# Patient Record
Sex: FEMALE | Race: BLACK OR AFRICAN AMERICAN
[De-identification: names, ages, dates, MRNs, and addresses within clinical notes are randomized per-mention and may not be internally consistent; named-entity substitution may affect disease eponyms.]

---

## 2017-06-17 ENCOUNTER — HOSPITAL ENCOUNTER (EMERGENCY)
Dept: HOSPITAL 25 - ED | Age: 22
Discharge: HOME | End: 2017-06-17
Payer: COMMERCIAL

## 2017-06-17 VITALS — DIASTOLIC BLOOD PRESSURE: 69 MMHG | SYSTOLIC BLOOD PRESSURE: 109 MMHG

## 2017-06-17 DIAGNOSIS — F31.9: ICD-10-CM

## 2017-06-17 DIAGNOSIS — F41.9: Primary | ICD-10-CM

## 2017-06-17 LAB
ALBUMIN SERPL BCG-MCNC: 4.9 G/DL (ref 3.2–5.2)
ALP SERPL-CCNC: 38 U/L (ref 34–104)
ALT SERPL W P-5'-P-CCNC: 12 U/L (ref 7–52)
ANION GAP SERPL CALC-SCNC: 12 MMOL/L (ref 2–11)
APAP SERPL-MCNC: < 15 MCG/ML
AST SERPL-CCNC: 19 U/L (ref 13–39)
BUN SERPL-MCNC: 18 MG/DL (ref 6–24)
BUN/CREAT SERPL: 23.4 (ref 8–20)
CALCIUM SERPL-MCNC: 9.5 MG/DL (ref 8.6–10.3)
CHLORIDE SERPL-SCNC: 102 MMOL/L (ref 101–111)
GLOBULIN SER CALC-MCNC: 2.9 G/DL (ref 2–4)
GLUCOSE SERPL-MCNC: 89 MG/DL (ref 70–100)
HCO3 SERPL-SCNC: 23 MMOL/L (ref 22–32)
HCT VFR BLD AUTO: 40 % (ref 35–47)
HGB BLD-MCNC: 12.8 G/DL (ref 12–16)
MCH RBC QN AUTO: 27 PG (ref 27–31)
MCHC RBC AUTO-ENTMCNC: 32 G/DL (ref 31–36)
MCV RBC AUTO: 83 FL (ref 80–97)
POTASSIUM SERPL-SCNC: 3.3 MMOL/L (ref 3.5–5)
PROT SERPL-MCNC: 7.8 G/DL (ref 6.4–8.9)
RBC # BLD AUTO: 4.8 10^6/UL (ref 4–5.4)
SALICYLATES SERPL-MCNC: < 2.5 MG/DL (ref ?–30)
SODIUM SERPL-SCNC: 137 MMOL/L (ref 133–145)
TSH SERPL-ACNC: 3.77 MCIU/ML (ref 0.34–5.6)
WBC # BLD AUTO: 9 10^3/UL (ref 3.5–10.8)

## 2017-06-17 PROCEDURE — 80307 DRUG TEST PRSMV CHEM ANLYZR: CPT

## 2017-06-17 PROCEDURE — G0480 DRUG TEST DEF 1-7 CLASSES: HCPCS

## 2017-06-17 PROCEDURE — 87086 URINE CULTURE/COLONY COUNT: CPT

## 2017-06-17 PROCEDURE — 81015 MICROSCOPIC EXAM OF URINE: CPT

## 2017-06-17 PROCEDURE — 85025 COMPLETE CBC W/AUTO DIFF WBC: CPT

## 2017-06-17 PROCEDURE — 80320 DRUG SCREEN QUANTALCOHOLS: CPT

## 2017-06-17 PROCEDURE — 84702 CHORIONIC GONADOTROPIN TEST: CPT

## 2017-06-17 PROCEDURE — 99283 EMERGENCY DEPT VISIT LOW MDM: CPT

## 2017-06-17 PROCEDURE — 80329 ANALGESICS NON-OPIOID 1 OR 2: CPT

## 2017-06-17 PROCEDURE — 36415 COLL VENOUS BLD VENIPUNCTURE: CPT

## 2017-06-17 PROCEDURE — 80053 COMPREHEN METABOLIC PANEL: CPT

## 2017-06-17 PROCEDURE — 84443 ASSAY THYROID STIM HORMONE: CPT

## 2017-06-17 PROCEDURE — 81003 URINALYSIS AUTO W/O SCOPE: CPT

## 2017-06-17 NOTE — ED
Maximiliano HERNANDEZ SooYoung, scribed for Louie Peña MD on 06/17/17 at 0247 .





Complex/Multi-Sys Presentation





- HPI Summary


HPI Summary: 





A 20 y/o F presents to ED with c/o LUE "pressure" onset approx one hour ago. Pt 

states she hasn't taken her medication properly today. She notes feeling sleepy 

this afternoon and having "feelings of dementia," but currently in ED, she is 

wide awake. She was hospitalized on 5/31/2017 at [facility] in [place] for a "manic episode" and stayed there for four days. Pert 

PMHx: bipolar disorder. 








- History Of Current Complaint


Chief Complaint: EDGeneral


Time Seen by Provider: 06/17/17 02:38


Hx Obtained From: Patient, Medical Records


Onset/Duration: Still Present


Timing: Constant


Associated Signs And Symptoms: Positive: Recent Medication Changes - non-

compliance, Other - pos: AMS; LUE "pressure"





- Allergies/Home Medications


Allergies/Adverse Reactions: 


 Allergies











Allergy/AdvReac Type Severity Reaction Status Date / Time


 


No Known Allergies Allergy   Verified 06/17/17 02:42














PMH/Surg Hx/FS Hx/Imm Hx


Previously Healthy: No


Opthamlomology History: 


   Denies: Hx Legally Blind


Psychiatric History: Reports: Hx Bipolar Disorder


Infectious Disease History: 


   Denies: Traveled Outside the [place] in Last 30 Days





- Family History


Known Family History: Positive: Other - pos: brother with depression; neg: 

suicides in family





- Social History


Occupation: Student


Lives: Alone


Substance Use Comment - Amount & Last Used: denies





Review of Systems


Negative: Fever


Positive: Other - pos: LUE pressure


Psychological: Other - pos: AMS


All Other Systems Reviewed And Are Negative: Yes





Physical Exam


Triage Information Reviewed: Yes


Vital Signs On Initial Exam: 


 Initial Vitals











Temp Pulse Resp BP Pulse Ox


 


 98.1 F   89   18   115/76   100 


 


 06/17/17 02:38  06/17/17 02:38  06/17/17 02:38  06/17/17 02:38  06/17/17 02:38











Vital Signs Reviewed: Yes


Appearance: Positive: Well-Appearing, No Pain Distress


Skin: Positive: Warm


Head/Face: Positive: Normal Head/Face Inspection


ENT: Positive: Hearing grossly normal


Neck: Positive: Supple


Respiratory/Lung Sounds: Positive: Clear to Auscultation, Breath Sounds Present


Cardiovascular: Positive: RRR


Abdomen Description: Positive: Nontender, No Organomegaly, Soft


Bowel Sounds: Positive: Present


Musculoskeletal: Positive: Strength/ROM Intact


Neurological: Positive: Sensory/Motor Intact, Alert, Oriented to Person Place, 

Time


Psychiatric: Positive: Anxious





Diagnostics





- Vital Signs


 Vital Signs











  Temp Pulse Resp BP Pulse Ox


 


 06/17/17 02:38  98.1 F  89  18  115/76  100














- Laboratory


Result Diagrams: 


 06/17/17 03:00





 06/17/17 03:00


Lab Statement: Any lab studies that have been ordered have been reviewed, and 

results considered in the medical decision making process.





Re-Evaluation





- Re-Evaluation


  ** First Eval


Change: Improved - pt seen and cleared by crisis





Complex Multi-Symp Course/Dx


Course Of Treatment: Pt given Motrin in ED. Pt is medically cleared for MHE at 

0342.





- Diagnoses


Provider Diagnoses: 


 Anxiety








Discharge





- Discharge Plan


Condition: Fair


Disposition: HOME


Referrals: 


[facility] [Primary Care Provider] - 


Additional Instructions: 


Per completion of a mental health evaluation, you are cleared for release and 

do not require inpatient psychiatric hospitalization at this time.  Please go 

to nearest emergency room or call 911 if safety concerns arise or condition 

worsens.





Follow up with [facility] as soon as possible





Important Phone Numbers:





[facility]   ph:[phone]


[facility]   ph:[phone]


National Suicide Prevention Lifeline   ph:624-951-XLMX (1806)


[facility]   ph:[phone]


Alcoholics Anonymous   ph:[phone]


[facility]   ph:[phone]


[facility]   ph:[phone]





The documentation as recorded by the Maximiliano pineda SooYoung accurately 

reflects the service I personally performed and the decisions made by me, 

Louie Peña MD.

## 2022-04-14 ENCOUNTER — APPOINTMENT (RX ONLY)
Dept: URBAN - METROPOLITAN AREA CLINIC 93 | Facility: CLINIC | Age: 27
Setting detail: DERMATOLOGY
End: 2022-04-14

## 2022-04-14 ENCOUNTER — RX ONLY (OUTPATIENT)
Age: 27
Setting detail: RX ONLY
End: 2022-04-14

## 2022-04-14 DIAGNOSIS — L81.4 OTHER MELANIN HYPERPIGMENTATION: ICD-10-CM

## 2022-04-14 DIAGNOSIS — Z12.83 ENCOUNTER FOR SCREENING FOR MALIGNANT NEOPLASM OF SKIN: ICD-10-CM

## 2022-04-14 DIAGNOSIS — L83 ACANTHOSIS NIGRICANS: ICD-10-CM | Status: INADEQUATELY CONTROLLED

## 2022-04-14 PROCEDURE — ? PRESCRIPTION

## 2022-04-14 PROCEDURE — ? COUNSELING

## 2022-04-14 PROCEDURE — ? MEDICATION COUNSELING

## 2022-04-14 PROCEDURE — ? TREATMENT REGIMEN

## 2022-04-14 PROCEDURE — 99203 OFFICE O/P NEW LOW 30 MIN: CPT

## 2022-04-14 RX ORDER — HYDROQUINONE 4 %
CREAM (GRAM) TOPICAL QHS
Qty: 1 | Refills: 1 | Status: ERX | COMMUNITY
Start: 2022-04-14

## 2022-04-14 RX ORDER — HYDROQUINONE 4 %
CREAM (GRAM) TOPICAL
Qty: 1 | Refills: 1 | Status: CANCELLED | COMMUNITY
Start: 2022-04-14

## 2022-04-14 RX ORDER — AMMONIUM LACTATE 12 G/100G
CREAM TOPICAL
Qty: 140 | Refills: 0 | Status: ERX | COMMUNITY
Start: 2022-04-14

## 2022-04-14 RX ORDER — TRETIONIN 1 MG/G
CREAM TOPICAL
Qty: 45 | Refills: 1 | Status: ERX | COMMUNITY
Start: 2022-04-14

## 2022-04-14 RX ADMIN — TRETIONIN: 1 CREAM TOPICAL at 00:00

## 2022-04-14 RX ADMIN — Medication: at 00:00

## 2022-04-14 RX ADMIN — AMMONIUM LACTATE: 12 CREAM TOPICAL at 00:00

## 2022-04-14 ASSESSMENT — LOCATION ZONE DERM: LOCATION ZONE: TRUNK

## 2022-04-14 ASSESSMENT — LOCATION DETAILED DESCRIPTION DERM: LOCATION DETAILED: SUPERIOR THORACIC SPINE

## 2022-04-14 ASSESSMENT — LOCATION SIMPLE DESCRIPTION DERM: LOCATION SIMPLE: UPPER BACK

## 2022-04-14 NOTE — PROCEDURE: MEDICATION COUNSELING
Xelterriez Pregnancy And Lactation Text: This medication is Pregnancy Category D and is not considered safe during pregnancy.  The risk during breast feeding is also uncertain.

## 2022-04-14 NOTE — PROCEDURE: TREATMENT REGIMEN
Detail Level: Zone
Initiate Treatment: Hydroquinone and ammonium lactate
Plan: Advised patient to combine tretinoin and hydroquinone and apply to affected areas
Continue Regimen: Tretinoin 0.1%

## 2022-08-01 ENCOUNTER — APPOINTMENT (RX ONLY)
Dept: URBAN - METROPOLITAN AREA CLINIC 93 | Facility: CLINIC | Age: 27
Setting detail: DERMATOLOGY
End: 2022-08-01

## 2022-08-01 DIAGNOSIS — L83 ACANTHOSIS NIGRICANS: ICD-10-CM | Status: IMPROVED

## 2022-08-01 PROCEDURE — ? COUNSELING

## 2022-08-01 PROCEDURE — ? PRESCRIPTION MEDICATION MANAGEMENT

## 2022-08-01 PROCEDURE — ? TREATMENT REGIMEN

## 2022-08-01 PROCEDURE — 99214 OFFICE O/P EST MOD 30 MIN: CPT

## 2022-08-01 NOTE — PROCEDURE: TREATMENT REGIMEN
Modify Regimen: Incorporate Tretinoin 0.1% if no improvement with ammonium lactate
Detail Level: Zone
Discontinue Regimen: Hydroquinone (pt made lifestyle modifications and lost weight and pigmentation has improved without needing medication)
Continue Regimen: Ammonium lactate

## 2023-07-29 NOTE — PROCEDURE: MEDICATION COUNSELING
Jerry Brito Cyclosporine Pregnancy And Lactation Text: This medication is Pregnancy Category C and it isn't know if it is safe during pregnancy. This medication is excreted in breast milk.

## 2023-08-14 ENCOUNTER — APPOINTMENT (RX ONLY)
Dept: URBAN - METROPOLITAN AREA CLINIC 166 | Facility: CLINIC | Age: 28
Setting detail: DERMATOLOGY
End: 2023-08-14

## 2023-08-14 DIAGNOSIS — L83 ACANTHOSIS NIGRICANS: ICD-10-CM

## 2023-08-14 PROCEDURE — ? COUNSELING

## 2023-08-14 PROCEDURE — ? PRESCRIPTION

## 2023-08-14 PROCEDURE — 99213 OFFICE O/P EST LOW 20 MIN: CPT

## 2023-08-14 PROCEDURE — ? FULL BODY SKIN EXAM

## 2023-08-14 RX ORDER — TRETIONIN 1 MG/G
CREAM TOPICAL
Qty: 45 | Refills: 2 | Status: ERX

## 2023-08-14 RX ORDER — HYDROCORTISONE 25 MG/G
CREAM TOPICAL
Qty: 30 | Refills: 2 | Status: ERX | COMMUNITY
Start: 2023-08-14

## 2023-08-14 RX ADMIN — HYDROCORTISONE: 25 CREAM TOPICAL at 00:00

## 2023-08-14 ASSESSMENT — SEVERITY ASSESSMENT: SEVERITY: MILD TO MODERATE
